# Patient Record
Sex: MALE | Race: WHITE | NOT HISPANIC OR LATINO | URBAN - METROPOLITAN AREA
[De-identification: names, ages, dates, MRNs, and addresses within clinical notes are randomized per-mention and may not be internally consistent; named-entity substitution may affect disease eponyms.]

---

## 2024-08-09 ENCOUNTER — OFFICE VISIT (OUTPATIENT)
Age: 19
End: 2024-08-09
Payer: COMMERCIAL

## 2024-08-09 VITALS
OXYGEN SATURATION: 99 % | HEART RATE: 112 BPM | WEIGHT: 129.4 LBS | HEIGHT: 68 IN | DIASTOLIC BLOOD PRESSURE: 70 MMHG | SYSTOLIC BLOOD PRESSURE: 114 MMHG | BODY MASS INDEX: 19.61 KG/M2

## 2024-08-09 DIAGNOSIS — M99.01 SEGMENTAL DYSFUNCTION OF CERVICAL REGION: ICD-10-CM

## 2024-08-09 DIAGNOSIS — M99.02 SEGMENTAL DYSFUNCTION OF THORACIC REGION: ICD-10-CM

## 2024-08-09 DIAGNOSIS — M79.18 MYOFASCIAL PAIN: Primary | ICD-10-CM

## 2024-08-09 PROCEDURE — 99203 OFFICE O/P NEW LOW 30 MIN: CPT | Performed by: CHIROPRACTOR

## 2024-08-09 NOTE — PROGRESS NOTES
1. Myofascial pain        2. Segmental dysfunction of thoracic region        3. Segmental dysfunction of cervical region          Assessment/Plan:    Review of Diagnostic Studies and Office Notes:    The patient's August 10, 2021 ultrasound report of the chest --left, Sara 15, 2023 pediatric office note (encounter for general adult medical examination without abnormal findings as primary diagnosis, no concerns at this time) were reviewed prior to the chiropractic evaluation today.    Decision and Treatment Plan:    The patient has a rounded shoulder posture with a forward head carriage placing abnormal stress on the cervical/upper thoracic musculature.  The patient is describing worsened upper thoracic, shoulder, and medial scapular symptoms with frequent and prolonged lifting.  Therefore the already hypertonic musculature is under more stress particularly as the workday progresses. The patient has myofascial findings as well as thoracic facet restrictions and cervical facet restrictions which can contribute to limited range of motion and pain.  The patient was taught pectoralis/subscapularis stretches to address his posterior.  The patient will benefit from strength exercises to the rhomboids, mid trapezius, inferior trapezius, and latissimus dorsi in the future.    The patient will be treated with conservative chiropractic care including myofascial release, joint mobilization, and a home stretching and icing program.    The patient was taught a pectoralis/subscapularis stretch today. The patient was advised to do the stretch for 3 sets of 15-20 seconds several times per day.The need to stretch to the point of tension only was discussed. ROM was measured with an [employer] digital dual inclinometer.    The patient will initially be seen 2 times per week and then 1 time per week as there are decreased symptoms and improvement in objective findings. The patient will then be discharged.    Patient  Instructions:    The patient was advised to apply ice to the region in 15-minute intervals a minimum of 3 times per day.  The patient was advised to avoid sleeping in the prone position. Proper sleeping postures and use of pillows were discussed.  The patient was advised to avoid prolonged cervical flexion. Proper techniques to use his phone and read were discussed.    Goals:    JMLGOALS: Improve patient's ability to tolerate prolonged physical activity, Improve cervical range of motion, and improve ability to tolerate frequent lifting (proper lifting/bending postures)    Time/Reviewed with Patient:    During the consultation the following was included: The patient's history/current complaints, physical exam, reviewing the office notes/diagnostic report, reviewing home instruction/reducing risk factors with the patient, reviewing my findings with the patient, and discussing the treatment/treatment plan with the patient.    Subjective:      Lew Joel is a 19 y.o. male who presents today with pain in the bilateral upper thoracic and medial scapular region.  He also indicated having discomfort into the bilateral shoulder region and upper arms.  The patient stated the symptoms began approximately 4 to 5 years ago.  He reported the symptoms were mild at that time but have worsened over the past month.  He stated there was no incident or trauma that caused the pain.  Instead it was a gradual onset of symptoms.  He reported that he does frequent lifting as he is a  at [employer] on [facility].  He stated that he has been a  for approximately 1 year.  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  He stated he has decree symptoms with rest.  The patient denied having symptoms distal to the elbow.  The patient reported that his right-sided symptoms are equal in intensity to  "the left.  The patient describes his pain level as a 3 on a 0-10 pain scale today.  He describes his pain level as a 6 on a 0-10 pain scale at its worst.     Objective:     /70   Pulse (!) 112   Ht 5' 8\" (1.727 m)   Wt 58.7 kg (129 lb 6.4 oz)   SpO2 99%   BMI 19.68 kg/m²     Appearance: Well developed, well nourished, and in no acute distress    Alert and oriented x 3    Mood/Affect: calm and pleasant    Gait/Posture:    Gait: Normal    Antalgic posture: None    Lordosis: Cervical- decreased    Kyphosis: Thoracic- normal    Upper extremity reflexes:    Deep tendon reflexes were normal and symmetrical in the upper extremities.    Upper Extremity Muscle Testing:    Muscle testing of the bilateral upper extremities was normal and graded +5/5.    Sensory:    Sensation to light touch was normal and symmetrical in the bilateral upper extremities.    Quiles's was negative bilaterally.    Cervical Orthopedic Tests:    Maximal Foraminal Compression was was negative  bilaterally.    Rotator Cuff Muscle Testing:    Right    Supraspinatus grade 5 - 100% normal (N) complete motion against gravity and full resistance    Infraspinatus grade 5 - 100% normal (N) complete motion against gravity and full resistance    Subscapularis grade 5 - 100% normal (N) complete motion against gravity and full resistance    No pain with muscle testing of the right rotator cuff musculature.    Left    Supraspinatus grade 5 - 100% normal (N) complete motion against gravity and full resistance    Infraspinatus grade 5 - 100% normal (N) complete motion against gravity and full resistance    Subscapularis grade 5 - 100% normal (N) complete motion against gravity and full resistance    No pain with muscle testing of the left rotator cuff musculature.    Shoulder Orthopedic Testing:    Inferior scratch test on the right was negative  as the patient was able to touch the mid to lower thoracic region.    Inferior scratch test on the left was " negative  as the patient was able to touch the mid to lower thoracic region..    Superior scratch test on the right was negative  as the patient was able to touch the T6 level.    Superior scratch test on the left was negative  as the patient was able to touch the T6 level.    Active Cervical Ranges of Motion:    Flexion: 58 degrees    Extension: 43 degrees    Right lateral flexion: 45 degrees    Left Lateral flexion: 39 degrees    Right Rotation: 78 degrees    Left Rotation: 86 degrees    Palpation:    Active myofascial trigger points and hypertonicity were present in the bilateral superior trapezius, levator scapulae, semispinalis capitis, splenius capitis, and splenius cervicis. Pressure to the above listed trigger points reproduced some of the pain described in today's chief complaint. Intersegmental motion was decreased in the cervical spine including joint dysfunctions at C1-C2, C5-C6, and C6-C7. Intersegmental motion was decreased in the thoracic spine including joint dysfunctions at T2-3, T3-4, T6-7, and T7-8.               Dictation voice to text software has been used in the creation of this document. Please consider this in light of any contextual or grammatical errors.

## 2024-08-09 NOTE — PATIENT INSTRUCTIONS
The patient was advised to apply ice to the region in 15-minute intervals a minimum of 3 times per day.  The patient was advised to avoid sleeping in the prone position. Proper sleeping postures and use of pillows were discussed.  The patient was advised to avoid prolonged cervical flexion. Proper techniques to use his phone and read were discussed.

## 2024-08-14 ENCOUNTER — PROCEDURE VISIT (OUTPATIENT)
Age: 19
End: 2024-08-14
Payer: COMMERCIAL

## 2024-08-14 VITALS
HEIGHT: 68 IN | SYSTOLIC BLOOD PRESSURE: 128 MMHG | DIASTOLIC BLOOD PRESSURE: 80 MMHG | BODY MASS INDEX: 19.55 KG/M2 | WEIGHT: 129 LBS | OXYGEN SATURATION: 98 % | HEART RATE: 93 BPM

## 2024-08-14 DIAGNOSIS — M79.18 MYOFASCIAL PAIN: Primary | ICD-10-CM

## 2024-08-14 DIAGNOSIS — M99.02 SEGMENTAL DYSFUNCTION OF THORACIC REGION: ICD-10-CM

## 2024-08-14 DIAGNOSIS — M99.01 SEGMENTAL DYSFUNCTION OF CERVICAL REGION: ICD-10-CM

## 2024-08-14 PROCEDURE — 98940 CHIROPRACT MANJ 1-2 REGIONS: CPT | Performed by: CHIROPRACTOR

## 2024-08-14 NOTE — PATIENT INSTRUCTIONS
The patient was advised to continue with the icing and stretching instructions.   The patient was informed that he may experience additional soreness following the today's treatment visit. The need to continue with the stretching exercises and apply ice in 15-minute intervals was discussed with the patient.   The patient was informed they may see redness or possible bruising in the region of Graston technique treatment.

## 2024-08-14 NOTE — PROGRESS NOTES
Assessment:  Today is the patient's first chiropractic treatment visit.  The patient was informed he may experience additional soreness following the today's treatment visit. The need to continue with the stretching exercises and apply ice in 15-minute intervals was discussed with the patient.  The patient is in need of shoulder stabilization exercises as the sublux easily with external rotation coupled with abduction movements    1. Myofascial pain        2. Segmental dysfunction of thoracic region        3. Segmental dysfunction of cervical region          Plan:  Treatment today consisted of myofascial release via Graston Technique to the bilateral upper thoracic musculature including the levator scapula at the superior angle of the scapula (superficial). GT 3, GT 4, and GT 5 were used.  Myofascial release via active release technique was performed to the bilateral pectoralis minor.  Myofascial release via active release technique was performed to the bilateral pectoralis major (1 pass on each side, discontinued due to apparent subluxing of glenohumeral joint).    Manipulation was performed to the lower cervical restrictions via gentle cervical traction technique.  Manipulation was performed to the thoracic restrictions via grade 2 mobilization techniques and low force diversified maneuvers.  No high velocity thrust was applied.  An audible release occurred and was well-tolerated.    Discussion  The glenohumeral joint immediate reduced after the pass of active release technique.  The patient's upper extremity was approximately 35 degrees from parallel to the floor when the subluxation was noted to occur.    Subjective:  The patient reported feeling the same as his initial chiropractic evaluation.  He indicated having pain in the bilateral upper thoracic and medial scapular region.  He also indicated having discomfort into the bilateral shoulder region and upper arms.  The patient has more pronounced symptoms with  frequent lifting.  He has decreased symptoms with rest.  The patient denied having symptoms distal to the elbow.  The patient reported that his right-sided symptoms are equal in intensity to the left.  The patient describes his pain level as a 3 on a 0-10 pain scale today.  He describes his pain level as a 6 on a 0-10 pain scale at its worst.     Objective:  Active myofascial trigger points and hypertonicity were present in the bilateral superior trapezius, levator scapulae, semispinalis capitis, splenius capitis, and splenius cervicis. Pressure to the above listed trigger points reproduced some of the pain described in today's chief complaint. Intersegmental motion was decreased in the cervical spine including joint dysfunctions at C1-C2, C5-C6, and C6-C7. Intersegmental motion was decreased in the thoracic spine including joint dysfunctions at T2-3, T3-4, T6-7, and T7-8.  When the patient attempted active release technique to the pectoralis major it was noted that the ipsilateral glenohumeral joint subluxed briefly and the patient indicated that was common for him with this type of movement.      I have used the Epic copy/forward function to compose this note.  I have reviewed my current note to ensure it reflects the current patient status, exam, assessment and plan.     Dictation voice to text software has been used in the creation of this document. Please consider this in light of any contextual or grammatical errors.

## 2024-08-21 ENCOUNTER — PROCEDURE VISIT (OUTPATIENT)
Age: 19
End: 2024-08-21
Payer: COMMERCIAL

## 2024-08-21 VITALS
HEIGHT: 68 IN | WEIGHT: 129 LBS | SYSTOLIC BLOOD PRESSURE: 136 MMHG | HEART RATE: 89 BPM | DIASTOLIC BLOOD PRESSURE: 80 MMHG | BODY MASS INDEX: 19.55 KG/M2 | OXYGEN SATURATION: 98 %

## 2024-08-21 DIAGNOSIS — M99.01 SEGMENTAL DYSFUNCTION OF CERVICAL REGION: ICD-10-CM

## 2024-08-21 DIAGNOSIS — M79.18 MYOFASCIAL PAIN: Primary | ICD-10-CM

## 2024-08-21 DIAGNOSIS — M99.02 SEGMENTAL DYSFUNCTION OF THORACIC REGION: ICD-10-CM

## 2024-08-21 PROCEDURE — 98940 CHIROPRACT MANJ 1-2 REGIONS: CPT | Performed by: CHIROPRACTOR

## 2024-08-21 NOTE — PROGRESS NOTES
Assessment:  The patient is progressing as expected.  The patient was taught a push-up plus wall exercise for home use.  The patient was taught a push plus exercise (wall) to perform at 0 - 10: 2 sets of 0 - 10: 5 repetitions 0 - 10: 2 time per day.  The exercises also demonstrated to him in the push-up position on the floor but not correctly demonstrated by the patient.  Therefore the movement pattern will be learned while doing the exercise against standing with his hands on the wall initially with the hopes of progressing to the floor.    1. Myofascial pain        2. Segmental dysfunction of thoracic region        3. Segmental dysfunction of cervical region          Plan:  Treatment today consisted of myofascial release via Graston Technique to the bilateral upper thoracic musculature including the levator scapula at the superior angle of the scapula and supraspinatus (superficial to the bilateral supraspinatus). GT 3, GT 4, and GT 5 were used.  Myofascial release via active release technique was performed to the bilateral pectoralis minor.      Manipulation was performed to the lower cervical restrictions via gentle cervical traction technique.  Manipulation was performed to the thoracic restrictions via grade 2 mobilization techniques and low force diversified maneuvers.  No high velocity thrust was applied.  An audible release occurred and was well-tolerated.    Subjective:  The patient reported feeling a little better when compared to his initial chiropractic treatment.  The patient denied experiencing additional soreness associated with Graston Technique.  He indicated having pain in the bilateral upper thoracic and medial scapular region.  He also indicated having discomfort into the bilateral shoulder region and upper arms.  The patient has more pronounced symptoms with frequent lifting.  He has decreased symptoms with rest.  The patient denied having symptoms distal to the elbow.  The patient reported  that his right-sided symptoms are equal in intensity to the left.  Despite reporting feeling a little better the patient describes his pain level as a 6 on a 0-10 pain scale today which is higher than the last chiropractic treatment visit.      Objective:  Active myofascial trigger points and hypertonicity were present in the bilateral superior trapezius, levator scapulae, semispinalis capitis, splenius capitis, and splenius cervicis. Intersegmental motion was decreased in the cervical spine including joint dysfunctions at C1-C2, C5-C6, and C6-C7. Intersegmental motion was decreased in the thoracic spine including joint dysfunctions at T3-4, T6-7, and T7-8.        I have used the Epic copy/forward function to compose this note.  I have reviewed my current note to ensure it reflects the current patient status, exam, assessment and plan.     Dictation voice to text software has been used in the creation of this document. Please consider this in light of any contextual or grammatical errors.

## 2024-08-21 NOTE — PATIENT INSTRUCTIONS
The patient was advised to continue with the icing and stretching instructions.   The patient was taught a push plus exercise to perform at 0 - 10: 2 sets of 0 - 10: 5 repetitions 0 - 10: 2 time per day.

## 2024-08-23 ENCOUNTER — PROCEDURE VISIT (OUTPATIENT)
Age: 19
End: 2024-08-23
Payer: COMMERCIAL

## 2024-08-23 VITALS
HEIGHT: 68 IN | HEART RATE: 87 BPM | OXYGEN SATURATION: 97 % | BODY MASS INDEX: 19.55 KG/M2 | DIASTOLIC BLOOD PRESSURE: 68 MMHG | SYSTOLIC BLOOD PRESSURE: 104 MMHG | WEIGHT: 129 LBS

## 2024-08-23 DIAGNOSIS — M79.18 MYOFASCIAL PAIN: Primary | ICD-10-CM

## 2024-08-23 DIAGNOSIS — M99.01 SEGMENTAL DYSFUNCTION OF CERVICAL REGION: ICD-10-CM

## 2024-08-23 DIAGNOSIS — M99.02 SEGMENTAL DYSFUNCTION OF THORACIC REGION: ICD-10-CM

## 2024-08-23 PROCEDURE — 98940 CHIROPRACT MANJ 1-2 REGIONS: CPT | Performed by: CHIROPRACTOR

## 2024-08-23 NOTE — PROGRESS NOTES
Assessment:  The patient is progressing as expected.    1. Myofascial pain        2. Segmental dysfunction of thoracic region        3. Segmental dysfunction of cervical region          Plan:  Treatment today consisted of myofascial release via ischemic compression to the bilateral upper thoracic musculature.  Myofascial release via active release technique was performed to the bilateral pectoralis minor and junction of the bilateral superior trapezius at the levator scapula (supine combination move).      Manipulation was performed to the lower cervical restrictions via gentle cervical traction technique.  Manipulation was performed to the thoracic restrictions via grade 2 mobilization techniques and low force diversified maneuvers.  No high velocity thrust was applied.  An audible release occurred and was well-tolerated.    Subjective:  The patient reported feeling a little better when compared to his initial chiropractic treatment. He indicated having pain in the bilateral upper thoracic and medial scapular region.  The patient did not report a significant amount of soreness associated with the last chiropractic treatment visit/Graston Technique.  He also indicated having discomfort into the bilateral shoulder region and upper arms.  The patient has more pronounced symptoms with frequent lifting.  He has decreased symptoms with rest. The patient describes his pain level as a 5 on a 0-10 pain scale today.    Objective:  Active myofascial trigger points and hypertonicity were present in the bilateral superior trapezius, levator scapulae, semispinalis capitis, splenius capitis, and splenius cervicis. Intersegmental motion was decreased in the cervical spine including joint dysfunctions at C1-C2, C5-C6, and C6-C7. Intersegmental motion was decreased in the thoracic spine including joint dysfunctions at T3-4, T4-5, and T7-8.        I have used the Epic copy/forward function to compose this note.  I have reviewed my  current note to ensure it reflects the current patient status, exam, assessment and plan.     Dictation voice to text software has been used in the creation of this document. Please consider this in light of any contextual or grammatical errors.

## 2024-08-23 NOTE — PATIENT INSTRUCTIONS
The patient was advised to continue with the icing and stretching instructions.   The patient was advised to continue with the home stretching and strengthening exercises.   The patient was informed that he may experience additional soreness following the today's treatment visit. The need to continue with the stretching exercises and apply ice in 15-minute intervals was discussed with the patient.

## 2024-08-28 ENCOUNTER — PROCEDURE VISIT (OUTPATIENT)
Age: 19
End: 2024-08-28
Payer: COMMERCIAL

## 2024-08-28 VITALS
HEIGHT: 68 IN | SYSTOLIC BLOOD PRESSURE: 126 MMHG | DIASTOLIC BLOOD PRESSURE: 80 MMHG | WEIGHT: 129 LBS | BODY MASS INDEX: 19.55 KG/M2 | OXYGEN SATURATION: 98 % | HEART RATE: 83 BPM

## 2024-08-28 DIAGNOSIS — M79.18 MYOFASCIAL PAIN: Primary | ICD-10-CM

## 2024-08-28 DIAGNOSIS — M99.01 SEGMENTAL DYSFUNCTION OF CERVICAL REGION: ICD-10-CM

## 2024-08-28 DIAGNOSIS — M99.02 SEGMENTAL DYSFUNCTION OF THORACIC REGION: ICD-10-CM

## 2024-08-28 PROCEDURE — 98940 CHIROPRACT MANJ 1-2 REGIONS: CPT | Performed by: CHIROPRACTOR

## 2024-08-28 NOTE — PATIENT INSTRUCTIONS
The patient was advised to continue with the icing and stretching instructions.   The patient was informed that he may experience additional soreness following the today's treatment visit. The need to continue with the stretching exercises and apply ice in 15-minute intervals was discussed with the patient.

## 2024-08-28 NOTE — PROGRESS NOTES
Assessment:  Patient is describing an exacerbation of symptoms.  However the patient is not sure as he indicated that it was when he was lying facedown but he was lying facedown during the first part of the last chiropractic treatment as ischemic compression was being performed.  He indicated that the pain was present with too much digital pressure.  He then stated that he thinks it may have been when he was lying supine.  In review of my notes it was noted that I had done active release technique to the junction of the superior trapezius with the levator scapula.  This seems to be more likely of a source of more pronounced neck discomfort.  Nonetheless neither of those forms of treatment will be performed.    1. Myofascial pain        2. Segmental dysfunction of thoracic region        3. Segmental dysfunction of cervical region          Plan:  Treatment today consisted of myofascial release via Graston Technique to the bilateral upper thoracic musculature including the levator scapula at the superior angle of the scapula and supraspinatus (superficial to the bilateral supraspinatus). GT 3, GT 4, and GT 5 were used.  Myofascial release via active release technique was performed to the bilateral pectoralis minor.      Manipulation was performed to the thoracic restrictions via grade 2 mobilization techniques and low force diversified maneuvers.  No high velocity thrust was applied.  An audible release occurred and was well-tolerated.    Subjective:  The patient reported feeling increased pain in the bilateral posterior cervical/cervical thoracic region after his chiropractic treatment.  He also describes experiencing a headache in the occipital portion of his head.  He stated this persisted for approximately 3 days.  The patient stated it was due to one of the last part of the treatment during the last chiropractic visit.  He then indicated that it was when he was lying facedown and digital pressure was being applied  to the muscles which is how his ischemic compression was being performed.  However ischemic compression was performed first.  The patient stated that the pain in the posterior cervical region fully resolved and he does not have a headache any longer.  He indicated having pain in the bilateral upper thoracic and medial scapular region. The patient has more pronounced symptoms with frequent lifting.  He has decreased symptoms with rest. The patient describes his pain level as a 6 on a 0-10 pain scale today.    Objective:  Active myofascial trigger points and hypertonicity were present in the bilateral superior trapezius, pectoralis minor, supraspinatus, pectoralis major, and levator scapulae. Intersegmental motion was decreased in the cervical spine including joint dysfunctions at C1-C2, C5-C6, and C6-C7. Intersegmental motion was decreased in the thoracic spine including joint dysfunctions at T3-4 and T7-8.        I have used the Epic copy/forward function to compose this note.  I have reviewed my current note to ensure it reflects the current patient status, exam, assessment and plan.     Dictation voice to text software has been used in the creation of this document. Please consider this in light of any contextual or grammatical errors.

## 2024-09-04 ENCOUNTER — PROCEDURE VISIT (OUTPATIENT)
Age: 19
End: 2024-09-04
Payer: COMMERCIAL

## 2024-09-04 VITALS
HEART RATE: 89 BPM | HEIGHT: 68 IN | DIASTOLIC BLOOD PRESSURE: 66 MMHG | SYSTOLIC BLOOD PRESSURE: 124 MMHG | BODY MASS INDEX: 19.55 KG/M2 | WEIGHT: 129 LBS | OXYGEN SATURATION: 99 %

## 2024-09-04 DIAGNOSIS — M99.02 SEGMENTAL DYSFUNCTION OF THORACIC REGION: ICD-10-CM

## 2024-09-04 DIAGNOSIS — M79.18 MYOFASCIAL PAIN: Primary | ICD-10-CM

## 2024-09-04 DIAGNOSIS — M99.01 SEGMENTAL DYSFUNCTION OF CERVICAL REGION: ICD-10-CM

## 2024-09-04 PROCEDURE — 98940 CHIROPRACT MANJ 1-2 REGIONS: CPT | Performed by: CHIROPRACTOR

## 2024-09-04 NOTE — PROGRESS NOTES
Assessment:  The patient is progressing as expected.  The push-up plus wall exercise and pectoralis/subscapularis stretch were reviewed with the patient again.  Corrections were made to the push-up plus wall exercise as the patient had performed incorrectly.    1. Myofascial pain        2. Segmental dysfunction of thoracic region        3. Segmental dysfunction of cervical region          Plan:  Treatment today consisted of myofascial release via Graston Technique to the bilateral upper thoracic musculature including the levator scapula at the superior angle of the scapula and supraspinatus. GT 3, GT 4, and GT 5 were used.  Myofascial release via active release technique was performed to the bilateral subscapularis (patient performed active movements)    Manipulation was performed to the thoracic restrictions via grade 1 mobilization techniques in the prone position.  No high velocity thrust was applied.    Subjective:  The patient reported feeling the same as the last chiropractic treatment.  The patient reported that he has been doing the prescribed exercise (push-up plus exercise) but indicated that he has not been doing it as frequently as suggested.  He indicated having pain in the bilateral upper thoracic and medial scapular region.  The patient reported that he worked throughout the Labor Day weekend and they were very busy at work. The patient has more pronounced symptoms with frequent lifting.  He has decreased symptoms with rest. The patient describes his pain level as a 6 on a 0-10 pain scale today.    Objective:  The patient performed the push-up plus wall exercise and pectoralis/subscapularis stretching exercise incorrectly.  Active myofascial trigger points and hypertonicity were present in the bilateral superior trapezius, levator scapulae, semispinalis capitis, splenius capitis, and splenius cervicis. Intersegmental motion was decreased in the cervical spine including joint dysfunctions at C1-C2,  C5-C6, and C6-C7. Intersegmental motion was decreased in the thoracic spine including joint dysfunctions at T3-4, T6-7, and T7-8.        I have used the Epic copy/forward function to compose this note.  I have reviewed my current note to ensure it reflects the current patient status, exam, assessment and plan.     Dictation voice to text software has been used in the creation of this document. Please consider this in light of any contextual or grammatical errors.

## 2024-09-18 ENCOUNTER — PROCEDURE VISIT (OUTPATIENT)
Age: 19
End: 2024-09-18
Payer: COMMERCIAL

## 2024-09-18 VITALS
BODY MASS INDEX: 19.55 KG/M2 | WEIGHT: 129 LBS | HEIGHT: 68 IN | SYSTOLIC BLOOD PRESSURE: 116 MMHG | OXYGEN SATURATION: 99 % | HEART RATE: 76 BPM | DIASTOLIC BLOOD PRESSURE: 70 MMHG

## 2024-09-18 DIAGNOSIS — M99.02 SEGMENTAL DYSFUNCTION OF THORACIC REGION: ICD-10-CM

## 2024-09-18 DIAGNOSIS — M79.18 MYOFASCIAL PAIN: Primary | ICD-10-CM

## 2024-09-18 DIAGNOSIS — M99.01 SEGMENTAL DYSFUNCTION OF CERVICAL REGION: ICD-10-CM

## 2024-09-18 PROCEDURE — 98940 CHIROPRACT MANJ 1-2 REGIONS: CPT | Performed by: CHIROPRACTOR

## 2024-09-18 NOTE — PROGRESS NOTES
Assessment:  The patient is progressing as expected.  The push-up plus wall exercise was reviewed with the patient again.  The patient did the push-up plus wall exercise correctly.    1. Myofascial pain        2. Segmental dysfunction of thoracic region        3. Segmental dysfunction of cervical region          Plan:  Treatment today consisted of myofascial release via Graston Technique to the bilateral upper thoracic musculature including the levator scapula at the superior angle of the scapula and supraspinatus. GT 3, GT 4, and GT 5 were used.  Myofascial release via active release technique was performed to the bilateral subscapularis (patient performed active movements) and bilateral pectoralis minor.    Manipulation was performed to the thoracic restrictions via grade 1 mobilization techniques in the prone position.  No high velocity thrust was applied.    Subjective:  The patient reported feeling better when compared to the last chiropractic treatment.  However he stated that he has not been working over the past week as he had been at a festival and also a concert.  The patient reported that he has not been doing the prescribed exercise (push-up plus exercise) as frequently as suggested.  He indicated having intermittent pain in the bilateral upper thoracic and medial scapular region but not currently. The patient has more pronounced symptoms with frequent lifting.  He has decreased symptoms with rest. The patient describes his pain level as a 0 on a 0-10 pain scale today.    Objective:  The patient performed the push-up plus wall exercise correctly.  Active myofascial trigger points and hypertonicity were present in the bilateral superior trapezius, levator scapulae, semispinalis capitis, splenius capitis, and splenius cervicis. Intersegmental motion was decreased in the cervical spine including joint dysfunctions at C1-C2, C5-C6, and C6-C7. Intersegmental motion was decreased in the thoracic spine  including joint dysfunctions at T3-4, T4-5, and T7-8.        I have used the Epic copy/forward function to compose this note.  I have reviewed my current note to ensure it reflects the current patient status, exam, assessment and plan.     Dictation voice to text software has been used in the creation of this document. Please consider this in light of any contextual or grammatical errors.

## 2024-09-18 NOTE — PATIENT INSTRUCTIONS
The patient was advised to continue with the home stretching and strengthening exercises. The patient was advised to apply ice to the region in 15-minute intervals a minimum of 3 times per day.

## 2024-09-25 ENCOUNTER — PROCEDURE VISIT (OUTPATIENT)
Age: 19
End: 2024-09-25
Payer: COMMERCIAL

## 2024-09-25 VITALS
SYSTOLIC BLOOD PRESSURE: 110 MMHG | HEIGHT: 68 IN | HEART RATE: 77 BPM | BODY MASS INDEX: 19.55 KG/M2 | DIASTOLIC BLOOD PRESSURE: 66 MMHG | WEIGHT: 129 LBS | OXYGEN SATURATION: 96 %

## 2024-09-25 DIAGNOSIS — M79.18 MYOFASCIAL PAIN: Primary | ICD-10-CM

## 2024-09-25 DIAGNOSIS — M99.02 SEGMENTAL DYSFUNCTION OF THORACIC REGION: ICD-10-CM

## 2024-09-25 DIAGNOSIS — M99.01 SEGMENTAL DYSFUNCTION OF CERVICAL REGION: ICD-10-CM

## 2024-09-25 PROCEDURE — 98940 CHIROPRACT MANJ 1-2 REGIONS: CPT | Performed by: CHIROPRACTOR

## 2024-09-25 NOTE — PATIENT INSTRUCTIONS
The patient was advised to continue with the home stretching and strengthening exercises.   The patient was advised to do the stretching exercises a minimum of 3 times per day. Otherwise progress will be slowed. The need to stretch to the point of tension only was emphasized.  The patient was advised to apply ice to the region in 15-minute intervals a minimum of 3 times per day.   The patient was informed that he may experience additional soreness following the today's treatment visit. The need to continue with the stretching exercises and apply ice in 15-minute intervals was discussed with the patient.

## 2024-09-25 NOTE — PROGRESS NOTES
Assessment:  The patient is only making chiropractic appointments 1 time per week.  It is expected that this will slow his progress and make it difficult for his symptoms to decrease as there is a large gap between chiropractic visits.  He also indicated that he is not doing the stretching and strengthening exercises as often as instructed.  This will also slow his progress.  The patient was advised to do the stretching exercises a minimum of 3 times per day. Otherwise progress will be slowed. The need to stretch to the point of tension only was emphasized.  The patient was advised to apply ice to the region in 15-minute intervals a minimum of 3 times per day.   I reviewed the patient's initial chiropractic treatment note as he had indicated experiencing some improvement afterwards.  The same chiropractic treatment plan was performed today.  The push-up plus wall exercise and pectoralis/subscapularis stretch were reviewed again with the patient and corrections were made.    1. Myofascial pain        2. Segmental dysfunction of thoracic region        3. Segmental dysfunction of cervical region          Plan:  Treatment today consisted of myofascial release via Graston Technique to the bilateral upper thoracic musculature including the levator scapula at the superior angle of the scapula and supraspinatus. GT 3, GT 4, and GT 5 were used.  Myofascial release via active release technique was performed to the bilateral subscapularis (patient performed active movements) and bilateral pectoralis minor.    Manipulation was performed to the thoracic restrictions via grade 2 mobilization techniques and low force diversified maneuvers.  No high velocity thrust was applied.    Subjective:  The patient reported feeling the same as the last chiropractic treatment. The patient reported that he has not been doing the prescribed exercise (push-up plus exercise) as frequently as suggested.  He indicated having intermittent pain in  the bilateral upper thoracic and medial scapular region.  He reported that he worked over the weekend. The patient has more pronounced symptoms with frequent lifting.  He has decreased symptoms with rest. The patient describes his pain level as a 6 on a 0-10 pain scale today.    Objective:  The patient performed the push-up plus wall exercise with the minor need of corrections.  The patient also demonstrated the pectoralis/subscapularis stretch with the need of corrections.  Active myofascial trigger points and hypertonicity were present in the bilateral superior trapezius, levator scapulae, and splenius capitis. Intersegmental motion was decreased in the cervical spine including joint dysfunctions at C1-C2, C5-C6, and C6-C7. Intersegmental motion was decreased in the thoracic spine including joint dysfunctions at T3-4, T6-7, and T7-8.        I have used the Epic copy/forward function to compose this note.  I have reviewed my current note to ensure it reflects the current patient status, exam, assessment and plan.     Dictation voice to text software has been used in the creation of this document. Please consider this in light of any contextual or grammatical errors.

## 2024-09-27 ENCOUNTER — PROCEDURE VISIT (OUTPATIENT)
Age: 19
End: 2024-09-27
Payer: COMMERCIAL

## 2024-09-27 VITALS
HEIGHT: 68 IN | OXYGEN SATURATION: 99 % | WEIGHT: 129 LBS | SYSTOLIC BLOOD PRESSURE: 108 MMHG | BODY MASS INDEX: 19.55 KG/M2 | HEART RATE: 81 BPM | DIASTOLIC BLOOD PRESSURE: 74 MMHG

## 2024-09-27 DIAGNOSIS — M79.18 MYOFASCIAL PAIN: Primary | ICD-10-CM

## 2024-09-27 DIAGNOSIS — M99.01 SEGMENTAL DYSFUNCTION OF CERVICAL REGION: ICD-10-CM

## 2024-09-27 DIAGNOSIS — M99.02 SEGMENTAL DYSFUNCTION OF THORACIC REGION: ICD-10-CM

## 2024-09-27 PROCEDURE — 99213 OFFICE O/P EST LOW 20 MIN: CPT | Performed by: CHIROPRACTOR

## 2024-09-27 NOTE — PROGRESS NOTES
1. Myofascial pain        2. Segmental dysfunction of thoracic region        3. Segmental dysfunction of cervical region          Assessment/Plan:    Review of office note/neck index:    The patient's August 9, 2024 initial chiropractic evaluation office note and neck index were reviewed today to determine progress since the chiropractic initial evaluation.    Medical Decision Making and Treatment Plan:    The patient described his symptoms as 20% improved overall.  It should be noted that the patient is supposed to attend chiropractic visits 2 times per week in order to make adequate progress.  The patient has only been attending chiropractic visits 1 time per week.  If the patient has a decreased frequency of chiropractic care it is necessary to do the stretching and strengthening exercises frequently but the patient has not been doing the stretching and strengthening exercises as often as instructed.  Therefore it is expected that he would have slowed progress.  Nonetheless the patient is reporting improvement and has improvement in objective findings.    The patient is indicating having pain in the first metacarpal phalangeal joints of the bilateral hands.  He is indicating that this is present with prolonged use of the hands and playing video games and has decreased in intensity over the past 5 years as he has been playing videogames less frequently.  The patient indicated that the first metacarpal phalangeal joint pain is relieved with self manipulation of the joints indicating a mechanical source of symptoms.  The patient's hand symptoms will be monitored and if the symptoms persist he may be referred for a rheumatology evaluation.    Objectively, the patient has increased cervical flexion and left lumbar lateral flexion with mild increase in cervical extension but decreased left cervical rotation when compared to his  initial chiropractic evaluation. his neck index decreased from 20 to 16 indicating  "positive functional improvement since his initial chiropractic evaluation.    Due to the improvement in objective findings and function and report of relief of symptoms I will continue with chiropractic care.    The patient will be treated with conservative chiropractic care including myofascial release, joint mobilization, and a home stretching and icing program.    The patient will be seen 2 times per week for 4 weeks and then reassess for progress.  Treatment compliance was reviewed with the patient.  Home treatment compliance was also reviewed with the patient.    Patient Instructions:    The patient was advised to continue with the icing and stretching instructions.   The patient was advised to do the stretching exercises a minimum of 3 times per day. Otherwise progress will be slowed. The need to stretch to the point of tension only was emphasized.  The patient was advised to continue with the home stretching and strengthening exercises.     Goals:    JMLGOALS: Improve patient's ability to tolerate twisting coupled with reaching movements.    At least 20 minutes of time was spent with the patient during the consultation including the patient's history/current complaints, physical exam, reviewing the initial chiropractic evaluation office note and back index to determine progress, reviewing home instruction/reducing risk factors with the patient, reviewing my findings/progress with the patient, and discussing the treatment/treatment plan with the patient.    Subjective:      Lew Joel is a 19 y.o. male who reported feeling \"a little better\" when compared to the last chiropractic visit.  Overall he described the symptoms as 20% improved.  He points to the bilateral medial cervicothoracic region/upper thoracic/superior angle of the scapular region as location of symptoms/pain.  He also indicated having symptoms into the bilateral superior lateral shoulder and bilateral mid deltoid region.  The patient stated " "that he has increased symptoms in the cervicothoracic region/upper thoracic/superior angle of the scapular region and shoulder regions with twisting coupled with upper extremity reaching movements.  He reported that he is a  and needs to bag items.  The patient stated he has decreased symptoms in the cervicothoracic region/upper thoracic/superior angle of the scapular region and shoulder region with rest.  The patient was questioned whether he has pain or paresthesias in the upper extremities.  He denied experiencing shooting pain in the upper extremities or paresthesias in the upper extremities.  However he stated that he feels \"a little irritation\" (mild in intensity) in all of the metacarpal phalangeal joints of the bilateral hands.  The patient stated that the hand symptoms have been present over the past 5 years and had been worse when he played more video games.  He stated that he does not play video games for as long or as frequently and more recent years and therefore has less intense pain.  He reported he is aware of the pain in the metacarpal phalangeal joints of the left hand when he is writing.  He has left hand dominant.  The patient stated that he has decreased discomfort in the metacarpal phalangeal joints with self manipulation of the joints.  He reported that the metacarpal phalangeal joint pain of the bilateral hands is not constantly noted and he denied having any redness or swelling in the metacarpal phalangeal joint regions.  He stated that he is not aware of the discomfort in the metacarpal phalangeal joints of the bilateral hands while playing videogames but is aware of that afterwards.  The patient described his cervicothoracic region/upper thoracic/superior angle of the scapular region and shoulder pain as a 5 on a 0-10 pain scale today.         Objective:    Vitals:    09/27/24 1138   BP: 108/74   Pulse: 81   SpO2: 99%   Weight: 58.5 kg (129 lb)   Height: 5' 8\" (1.727 m) "     Appearance: Well developed, well nourished, and in no acute distress    Alert and oriented x 3    Mood/Affect: calm and pleasant    Gait/Posture:    Gait: Normal    Antalgic posture: None    Lordosis: Cervical - decreased    Kyphosis: Thoracic- normal    Upper extremity reflexes:    Deep tendon reflexes were normal and symmetrical in the upper extremities.    Upper Extremity Muscle Testing:    Muscle testing of the bilateral upper extremities was normal and graded +5/5.    Sensory:    Sensation to light touch was normal and symmetrical in the bilateral upper extremities.    Quiles's was negative bilaterally.    Cervical Orthopedic Tests:    Maximal Foraminal Compression was was negative  bilaterally.    Rotator Cuff Muscle Testing:    Right    Supraspinatus grade 5 - 100% normal (N) complete motion against gravity and full resistance    Infraspinatus grade 5 - 100% normal (N) complete motion against gravity and full resistance    Subscapularis grade 5 - 100% normal (N) complete motion against gravity and full resistance    No pain was present with muscle testing the right rotator cuff musculature.    Left    Supraspinatus grade 5 - 100% normal (N) complete motion against gravity and full resistance    Infraspinatus grade 5 - 100% normal (N) complete motion against gravity and full resistance    Subscapularis grade 5 - 100% normal (N) complete motion against gravity and full resistance    No pain was present with muscle testing the left rotator cuff musculature.    Active Cervical Ranges of Motion:    Flexion: 66 degrees    Extension: 46 degrees    Right lateral flexion: 44 degrees    Left Lateral flexion: 43 degrees    Right Rotation: 78 degrees    Left Rotation: 71 degrees    Palpation:  Active myofascial trigger points and hypertonicity were present in the bilateral superior trapezius, levator scapulae, and splenius capitis. Intersegmental motion was decreased in the cervical spine including joint  dysfunctions at C1-C2, C5-C6, and C6-C7. Intersegmental motion was decreased in the thoracic spine including joint dysfunctions at T3-4, T6-7, and T7-8.          I have used the Epic copy/forward function to compose this note. I have reviewed my current note to ensure it reflects the current patient status, exam, assessment and plan.    Dictation voice to text software has been used in the creation of this document. Please consider this in light of any contextual or grammatical errors.

## 2024-09-27 NOTE — PATIENT INSTRUCTIONS
The patient was advised to continue with the icing and stretching instructions.   The patient was advised to do the stretching exercises a minimum of 3 times per day. Otherwise progress will be slowed. The need to stretch to the point of tension only was emphasized.  The patient was advised to continue with the home stretching and strengthening exercises.

## 2024-10-09 ENCOUNTER — PROCEDURE VISIT (OUTPATIENT)
Age: 19
End: 2024-10-09
Payer: COMMERCIAL

## 2024-10-09 VITALS
HEIGHT: 68 IN | DIASTOLIC BLOOD PRESSURE: 76 MMHG | HEART RATE: 83 BPM | SYSTOLIC BLOOD PRESSURE: 122 MMHG | WEIGHT: 129 LBS | BODY MASS INDEX: 19.55 KG/M2 | OXYGEN SATURATION: 98 %

## 2024-10-09 DIAGNOSIS — M79.18 MYOFASCIAL PAIN: Primary | ICD-10-CM

## 2024-10-09 DIAGNOSIS — M99.02 SEGMENTAL DYSFUNCTION OF THORACIC REGION: ICD-10-CM

## 2024-10-09 DIAGNOSIS — M99.01 SEGMENTAL DYSFUNCTION OF CERVICAL REGION: ICD-10-CM

## 2024-10-09 PROCEDURE — 98940 CHIROPRACT MANJ 1-2 REGIONS: CPT | Performed by: CHIROPRACTOR

## 2024-10-09 NOTE — PROGRESS NOTES
Assessment:  The patient was advised to do the stretching exercises a minimum of 3 times per day. Otherwise progress will be slowed. The need to stretch to the point of tension only was emphasized.  The need to apply ice in 15 minute intervals several times per day (minimum of 3 times per day) was discussed with the patient. Otherwise the patient's progress may be slowed.  The patient was informed that he may experience additional soreness following the today's treatment visit. The need to continue with the stretching exercises and apply ice in 15-minute intervals was discussed with the patient.     1. Myofascial pain        2. Segmental dysfunction of thoracic region        3. Segmental dysfunction of cervical region          Plan:  Treatment today consisted of myofascial release via Graston Technique to the bilateral upper thoracic musculature including the levator scapula at the superior angle of the scapula and supraspinatus. GT 3, GT 4, and GT 5 were used.  Myofascial release via active release technique was performed to the bilateral subscapularis (patient performed active movements) and bilateral pectoralis minor.    Manipulation was performed to the thoracic restrictions via grade 2 mobilization techniques and low force diversified maneuvers.  No high velocity thrust was applied.  An audible release occurred and was well-tolerated.    Subjective:  The patient stated that he has some more pronounced symptoms in the upper thoracic/suprascapular region and superior lateral shoulder region today because he worked an unusually long shift 2 days ago.  The patient reported that, on days when he does the prescribed stretching exercises, he notices improvement in symptoms indicating that he does not do the stretching exercises multiple times daily.  The patient reported that he does not do the icing instructions as often as instructed either.  The patient has pain in the bilateral medial cervicothoracic region/upper  thoracic/superior angle of the scapular region, bilateral superior lateral shoulder, and bilateral mid deltoid region.  He has increased symptoms in the cervicothoracic region/upper thoracic/superior angle of the scapular region and shoulder regions with twisting coupled with upper extremity reaching movements.  He has decreased symptoms in the cervicothoracic region/upper thoracic/superior angle of the scapular region and shoulder region with rest. The patient described his cervicothoracic region/upper thoracic/superior angle of the scapular region and shoulder pain as a 6 on a 0-10 pain scale today.    Objective:  Active myofascial trigger points and hypertonicity were present in the bilateral superior trapezius, levator scapulae, and splenius capitis. Intersegmental motion was decreased in the cervical spine including joint dysfunctions at C1-C2, C5-C6, and C6-C7. Intersegmental motion was decreased in the thoracic spine including joint dysfunctions at T3-4, T6-7, and T7-8.          I have used the Epic copy/forward function to compose this note. I have reviewed my current note to ensure it reflects the current patient status, exam, assessment and plan.    Dictation voice to text software has been used in the creation of this document. Please consider this in light of any contextual or grammatical errors.

## 2024-10-09 NOTE — PATIENT INSTRUCTIONS
The patient was advised to continue with the icing and stretching instructions.   The patient was advised to do the stretching exercises a minimum of 3 times per day. Otherwise progress will be slowed. The need to stretch to the point of tension only was emphasized.  The need to apply ice in 15 minute intervals several times per day (minimum of 3 times per day) was discussed with the patient. Otherwise the patient's progress may be slowed.  The patient was informed that he may experience additional soreness following the today's treatment visit. The need to continue with the stretching exercises and apply ice in 15-minute intervals was discussed with the patient.

## 2024-10-16 ENCOUNTER — OFFICE VISIT (OUTPATIENT)
Age: 19
End: 2024-10-16
Payer: COMMERCIAL

## 2024-10-16 VITALS
OXYGEN SATURATION: 97 % | HEART RATE: 85 BPM | SYSTOLIC BLOOD PRESSURE: 110 MMHG | DIASTOLIC BLOOD PRESSURE: 72 MMHG | HEIGHT: 68 IN | BODY MASS INDEX: 19.61 KG/M2

## 2024-10-16 DIAGNOSIS — M99.02 SEGMENTAL DYSFUNCTION OF THORACIC REGION: ICD-10-CM

## 2024-10-16 DIAGNOSIS — M99.01 SEGMENTAL DYSFUNCTION OF CERVICAL REGION: ICD-10-CM

## 2024-10-16 DIAGNOSIS — M79.18 MYOFASCIAL PAIN: Primary | ICD-10-CM

## 2024-10-16 PROCEDURE — 98940 CHIROPRACT MANJ 1-2 REGIONS: CPT | Performed by: CHIROPRACTOR

## 2024-10-16 NOTE — PROGRESS NOTES
Assessment:    1. Myofascial pain        2. Segmental dysfunction of thoracic region        3. Segmental dysfunction of cervical region          Plan:  Treatment today consisted of myofascial release via Graston Technique to the bilateral upper thoracic musculature including the levator scapula at the superior angle of the scapula and supraspinatus. GT 3, GT 4, and GT 5 were used.  Myofascial release via active release technique was performed to the bilateral subscapularis (patient performed active movements) and bilateral pectoralis minor.    Manipulation was performed to the thoracic restrictions via grade 2 mobilization techniques and low force diversified maneuvers.  No high velocity thrust was applied.  An audible release occurred and was well-tolerated.    Subjective:  The patient stated he feels a little better when compared to the last chiropractic visit.  He stated he has been doing the stretching exercises daily and feels this is helping.  He reported that he has remained active and worked yesterday. he patient has pain in the bilateral medial cervicothoracic region/upper thoracic/superior angle of the scapular region, bilateral superior lateral shoulder, and bilateral mid deltoid region.  He has increased symptoms in the cervicothoracic region/upper thoracic/superior angle of the scapular region and shoulder regions with twisting coupled with upper extremity reaching movements.  He has decreased symptoms in the cervicothoracic region/upper thoracic/superior angle of the scapular region and shoulder region with rest. The patient described his cervicothoracic region/upper thoracic/superior angle of the scapular region and shoulder pain as a 5 on a 0-10 pain scale today.    Objective:  Active myofascial trigger points and hypertonicity were present in the bilateral superior trapezius, levator scapulae, and splenius capitis. Intersegmental motion was decreased in the thoracic spine including joint  dysfunctions at T3-4, T6-7, and T7-8.  Range of motion with active release technique maneuvers to the bilateral subscapularis and pectoralis minor are improving.         I have used the Epic copy/forward function to compose this note. I have reviewed my current note to ensure it reflects the current patient status, exam, assessment and plan.    Dictation voice to text software has been used in the creation of this document. Please consider this in light of any contextual or grammatical errors.

## 2024-10-16 NOTE — PATIENT INSTRUCTIONS
The patient was advised to continue with the icing and stretching instructions.   The patient was advised to continue with the home stretching and strengthening exercises.

## 2024-10-30 ENCOUNTER — PROCEDURE VISIT (OUTPATIENT)
Age: 19
End: 2024-10-30
Payer: COMMERCIAL

## 2024-10-30 VITALS
HEART RATE: 71 BPM | DIASTOLIC BLOOD PRESSURE: 64 MMHG | SYSTOLIC BLOOD PRESSURE: 110 MMHG | OXYGEN SATURATION: 98 % | HEIGHT: 68 IN | WEIGHT: 129 LBS | BODY MASS INDEX: 19.55 KG/M2

## 2024-10-30 DIAGNOSIS — M79.18 MYOFASCIAL PAIN: Primary | ICD-10-CM

## 2024-10-30 DIAGNOSIS — M99.01 SEGMENTAL DYSFUNCTION OF CERVICAL REGION: ICD-10-CM

## 2024-10-30 DIAGNOSIS — M99.02 SEGMENTAL DYSFUNCTION OF THORACIC REGION: ICD-10-CM

## 2024-10-30 PROCEDURE — 98940 CHIROPRACT MANJ 1-2 REGIONS: CPT | Performed by: CHIROPRACTOR

## 2024-10-30 NOTE — PROGRESS NOTES
"Assessment:    1. Myofascial pain        2. Segmental dysfunction of thoracic region        3. Segmental dysfunction of cervical region          Plan:  Treatment today consisted of myofascial release via Graston Technique to the bilateral upper thoracic musculature including the levator scapula at the superior angle of the scapula and supraspinatus. GT 3, GT 4, and GT 5 were used.  Myofascial release via active release technique was performed to the bilateral subscapularis (patient performed active movements) and bilateral pectoralis minor.    Manipulation was performed to the thoracic restrictions via grade 2 mobilization techniques and low force diversified maneuvers.  No high velocity thrust was applied.  An audible release occurred and was well-tolerated.    Subjective:  The patient stated he feels better when compared to the last chiropractic visit.  The patient reported that the intensity of his upper thoracic and bilateral superior lateral shoulder pain has not been much recently.  He stated it has been \"doing pretty good.\"  He stated he has been doing the stretching exercises.  He has continued to remain active. He patient has pain in the bilateral medial cervicothoracic region/upper thoracic/superior angle of the scapular region, bilateral superior lateral shoulder, and bilateral mid deltoid region.  He has increased symptoms in the cervicothoracic region/upper thoracic/superior angle of the scapular region and shoulder regions with twisting coupled with upper extremity reaching movements.  He has decreased symptoms in the cervicothoracic region/upper thoracic/superior angle of the scapular region and shoulder region with rest. The patient described his cervicothoracic region/upper thoracic/superior angle of the scapular region and shoulder pain as a 4 on a 0-10 pain scale today.    Objective:  Active myofascial trigger points and hypertonicity were present in the bilateral superior trapezius, levator " scapulae, and splenius capitis. Intersegmental motion was decreased in the thoracic spine including joint dysfunctions at T3-4, T4-5, and T7-8.  Range of motion with active release technique maneuvers to the bilateral subscapularis is better when compared to last chiropractic visit.  Hypertonicity is less pronounced in the bilateral pectoralis minor.         I have used the Epic copy/forward function to compose this note. I have reviewed my current note to ensure it reflects the current patient status, exam, assessment and plan.    Dictation voice to text software has been used in the creation of this document. Please consider this in light of any contextual or grammatical errors.

## 2024-11-06 ENCOUNTER — OFFICE VISIT (OUTPATIENT)
Age: 19
End: 2024-11-06
Payer: COMMERCIAL

## 2024-11-06 VITALS
OXYGEN SATURATION: 98 % | HEART RATE: 88 BPM | HEIGHT: 68 IN | WEIGHT: 129 LBS | SYSTOLIC BLOOD PRESSURE: 120 MMHG | BODY MASS INDEX: 19.55 KG/M2 | DIASTOLIC BLOOD PRESSURE: 80 MMHG

## 2024-11-06 DIAGNOSIS — M79.18 MYOFASCIAL PAIN: Primary | ICD-10-CM

## 2024-11-06 DIAGNOSIS — M99.01 SEGMENTAL DYSFUNCTION OF CERVICAL REGION: ICD-10-CM

## 2024-11-06 DIAGNOSIS — M99.02 SEGMENTAL DYSFUNCTION OF THORACIC REGION: ICD-10-CM

## 2024-11-06 PROCEDURE — 98940 CHIROPRACT MANJ 1-2 REGIONS: CPT | Performed by: CHIROPRACTOR

## 2024-11-06 NOTE — PROGRESS NOTES
"Assessment:    1. Myofascial pain        2. Segmental dysfunction of thoracic region        3. Segmental dysfunction of cervical region          Plan:  Treatment today consisted of myofascial release via Graston Technique to the bilateral upper thoracic musculature including the levator scapula at the superior angle of the scapula and supraspinatus. GT 3, GT 4, and GT 5 were used.  Myofascial release via active release technique was performed to the bilateral subscapularis (patient performed active movements) and bilateral pectoralis minor.    Manipulation was performed to the thoracic restrictions via grade 2 mobilization techniques and low force diversified maneuvers.  No high velocity thrust was applied.  An audible release occurred and was well-tolerated.    Subjective:  The patient stated he feels \"pretty good.\"  However he still described his pain level as a 4 on a 0-10 pain scale.  He reports that he is continue to work but is off today.  He stated he feels a mild symptoms in the bilateral medial upper thoracic region with most pronounced symptoms in the superior angle of the scapula/suprascapular border and bilateral superior lateral shoulder region. He has increased symptoms in the cervicothoracic region/upper thoracic/superior angle of the scapular region and shoulder regions with twisting coupled with upper extremity reaching movements.  He has decreased symptoms in the cervicothoracic region/upper thoracic/superior angle of the scapular region and shoulder region with rest. The patient described his pain level as a 4 on a 0-10 pain scale today.    Objective:  Active myofascial trigger points and hypertonicity were present in the bilateral superior trapezius, levator scapulae, and splenius capitis. Intersegmental motion was decreased in the thoracic spine including joint dysfunctions at T3-4, T6-7, and T7-8.  Range of motion with active release technique maneuvers to the bilateral subscapularis is better " when compared to last chiropractic visit.  Hypertonicity is less pronounced in the bilateral pectoralis minor.         I have used the Epic copy/forward function to compose this note. I have reviewed my current note to ensure it reflects the current patient status, exam, assessment and plan.    Dictation voice to text software has been used in the creation of this document. Please consider this in light of any contextual or grammatical errors.

## 2024-12-04 ENCOUNTER — PROCEDURE VISIT (OUTPATIENT)
Age: 19
End: 2024-12-04
Payer: COMMERCIAL

## 2024-12-04 VITALS
BODY MASS INDEX: 19.55 KG/M2 | WEIGHT: 129 LBS | DIASTOLIC BLOOD PRESSURE: 74 MMHG | OXYGEN SATURATION: 99 % | HEART RATE: 78 BPM | SYSTOLIC BLOOD PRESSURE: 130 MMHG | HEIGHT: 68 IN

## 2024-12-04 DIAGNOSIS — M79.18 MYOFASCIAL PAIN: Primary | ICD-10-CM

## 2024-12-04 DIAGNOSIS — M99.02 SEGMENTAL DYSFUNCTION OF THORACIC REGION: ICD-10-CM

## 2024-12-04 DIAGNOSIS — M99.01 SEGMENTAL DYSFUNCTION OF CERVICAL REGION: ICD-10-CM

## 2024-12-04 PROCEDURE — 98940 CHIROPRACT MANJ 1-2 REGIONS: CPT | Performed by: CHIROPRACTOR

## 2024-12-04 NOTE — PROGRESS NOTES
"Assessment:  I discussed the patient's compliance with the home exercise/icing program and chiropractic frequency of visits with the patient today.  I informed the patient that, although he has noticed improvement he is still describing moderate symptoms that can be addressed effectively with the provided chiropractic care and the home exercise program.  However the patient needs to do the home exercise program daily and needs to attend chiropractic visits more frequently in order to make adequate progress and have resolution of symptoms.  The patient indicated he understood    1. Myofascial pain        2. Segmental dysfunction of thoracic region        3. Segmental dysfunction of cervical region          Plan:  Treatment today consisted of myofascial release via Graston Technique to the bilateral upper thoracic musculature including the levator scapula at the superior angle of the scapula and supraspinatus. GT 3, GT 4, and GT 5 were used.  Myofascial release via active release technique was performed to the bilateral subscapularis (patient performed active movements) and bilateral pectoralis minor.    Manipulation was performed to the thoracic restrictions via grade 2 mobilization techniques and low force diversified maneuvers.  No high velocity thrust was applied.  An audible release occurred and was well-tolerated.    Subjective:  The patient stated his neck upper thoracic/shoulder and mid thoracic region have been feeling \"not bad.\"  He indicated having \"a little\" of discomfort but he still described his pain level as a 5 on a 0-10 pain scale.  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  He has symptoms in the bilateral medial upper thoracic region, " bilateral superior angle of the scapula/suprascapular border, and bilateral superior lateral shoulder region. He has increased symptoms in the cervicothoracic region/upper thoracic/superior angle of the scapular region and shoulder regions with twisting coupled with upper extremity reaching movements.  He has decreased symptoms in the cervicothoracic region/upper thoracic/superior angle of the scapular region and shoulder region with rest. The patient described his pain level as a 5 on a 0-10 pain scale today.  The patient was questioned how often he is doing the prescribed stretching exercises and strengthening exercises.  He stated he is not doing the exercises daily and stated that he is not doing the exercises as often as he should.  The patient reported that he has been unable to attend chiropractic visits because his grandfather passed away.    Objective:  Active myofascial trigger points and hypertonicity were present in the bilateral superior trapezius, levator scapulae, and splenius capitis. Intersegmental motion was decreased in the thoracic spine including joint dysfunctions at T3-4, T4-5, and T7-8.  Range of motion with active release technique maneuvers to the bilateral subscapularis is better when compared to last chiropractic visit.  Hypertonicity is less pronounced in the bilateral pectoralis minor.         I have used the Epic copy/forward function to compose this note. I have reviewed my current note to ensure it reflects the current patient status, exam, assessment and plan.    Dictation voice to text software has been used in the creation of this document. Please consider this in light of any contextual or grammatical errors.

## 2024-12-11 ENCOUNTER — OFFICE VISIT (OUTPATIENT)
Age: 19
End: 2024-12-11
Payer: COMMERCIAL

## 2024-12-11 VITALS
HEART RATE: 91 BPM | SYSTOLIC BLOOD PRESSURE: 110 MMHG | HEIGHT: 71 IN | BODY MASS INDEX: 18.06 KG/M2 | WEIGHT: 129 LBS | OXYGEN SATURATION: 97 % | DIASTOLIC BLOOD PRESSURE: 60 MMHG

## 2024-12-11 DIAGNOSIS — M99.02 SEGMENTAL DYSFUNCTION OF THORACIC REGION: ICD-10-CM

## 2024-12-11 DIAGNOSIS — M99.01 SEGMENTAL DYSFUNCTION OF CERVICAL REGION: ICD-10-CM

## 2024-12-11 DIAGNOSIS — M79.18 MYOFASCIAL PAIN: Primary | ICD-10-CM

## 2024-12-11 PROCEDURE — 98940 CHIROPRACT MANJ 1-2 REGIONS: CPT | Performed by: CHIROPRACTOR

## 2024-12-11 NOTE — PROGRESS NOTES
"Assessment:    1. Myofascial pain        2. Segmental dysfunction of thoracic region        3. Segmental dysfunction of cervical region          Plan:  Treatment today consisted of myofascial release via Graston Technique to the bilateral upper thoracic musculature including the levator scapula at the superior angle of the scapula and supraspinatus. GT 3, GT 4, and GT 5 were used.  Myofascial release via active release technique was performed to the bilateral subscapularis (patient performed active movements) and bilateral pectoralis minor.    Manipulation was performed to the thoracic restrictions via grade 2 mobilization techniques and low force diversified maneuvers.  No high velocity thrust was applied.  An audible release occurred and was well-tolerated.    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 He has symptoms in the bilateral medial upper thoracic region, bilateral superior angle of the scapula/suprascapular border, and bilateral superior lateral shoulder region. He has increased symptoms in the cervicothoracic region/upper thoracic/superior angle of the scapular region and shoulder regions with twisting coupled with upper extremity reaching movements.  He has decreased symptoms in the cervicothoracic region/upper thoracic/superior angle of the scapular region and shoulder region with rest. The patient described his pain level as a 4 on a 0-10 pain scale today.    Objective:  Active myofascial trigger points and hypertonicity were present in the bilateral superior trapezius, levator scapulae, and splenius capitis. Intersegmental motion was decreased in the thoracic spine including joint dysfunctions at  T4-5 and T7-8.  Range of motion with active release technique maneuvers to the " bilateral subscapularis is approaching normal limits on the right and is mildly limited on the left.       I have used the Epic copy/forward function to compose this note. I have reviewed my current note to ensure it reflects the current patient status, exam, assessment and plan.    Dictation voice to text software has been used in the creation of this document. Please consider this in light of any contextual or grammatical errors.

## 2024-12-18 ENCOUNTER — OFFICE VISIT (OUTPATIENT)
Age: 19
End: 2024-12-18
Payer: COMMERCIAL

## 2024-12-18 VITALS
HEIGHT: 68 IN | HEART RATE: 80 BPM | SYSTOLIC BLOOD PRESSURE: 112 MMHG | BODY MASS INDEX: 19.55 KG/M2 | OXYGEN SATURATION: 96 % | DIASTOLIC BLOOD PRESSURE: 70 MMHG | WEIGHT: 129 LBS

## 2024-12-18 DIAGNOSIS — M99.02 SEGMENTAL DYSFUNCTION OF THORACIC REGION: ICD-10-CM

## 2024-12-18 DIAGNOSIS — M99.01 SEGMENTAL DYSFUNCTION OF CERVICAL REGION: ICD-10-CM

## 2024-12-18 DIAGNOSIS — M79.18 MYOFASCIAL PAIN: Primary | ICD-10-CM

## 2024-12-18 PROCEDURE — 98940 CHIROPRACT MANJ 1-2 REGIONS: CPT | Performed by: CHIROPRACTOR

## 2024-12-18 NOTE — PROGRESS NOTES
"Assessment:    1. Myofascial pain        2. Segmental dysfunction of thoracic region        3. Segmental dysfunction of cervical region          Plan:  Treatment today consisted of myofascial release via Graston Technique to the bilateral upper thoracic musculature including the levator scapula at the superior angle of the scapula and supraspinatus. GT 3, GT 4, and GT 5 were used.  Myofascial release via active release technique was performed to the bilateral pectoralis minor.    Manipulation was performed to the thoracic restrictions via grade 2 mobilization techniques and low force diversified maneuvers.  No high velocity thrust was applied.  An audible release occurred and was well-tolerated.    Subjective:  Once again the patient stated his neck upper thoracic/shoulder and mid thoracic region have been feeling \"pretty good.\"  He has symptoms in the bilateral medial upper thoracic region, bilateral superior angle of the scapula/suprascapular border, and bilateral superior lateral shoulder region. He has increased symptoms in the cervicothoracic region/upper thoracic/superior angle of the scapular region and shoulder regions with twisting coupled with upper extremity reaching movements.  He has decreased symptoms in the cervicothoracic region/upper thoracic/superior angle of the scapular region and shoulder region with rest. The patient described his upper to mid thoracic pain level as a 4 on a 0-10 pain scale today.  The patient described his neck pain as a 0 on a 0-10 pain scale today.    Objective:  Active myofascial trigger points and hypertonicity were present in the bilateral superior trapezius, levator scapulae, and splenius capitis. Intersegmental motion was decreased in the thoracic spine including joint dysfunctions at T5-6 and T6-7.  Range of motion with active release technique maneuvers to the bilateral pectoralis minor are approaching normal limits.       I have used the Epic copy/forward function to " compose this note. I have reviewed my current note to ensure it reflects the current patient status, exam, assessment and plan.    Dictation voice to text software has been used in the creation of this document. Please consider this in light of any contextual or grammatical errors.

## 2024-12-27 ENCOUNTER — OFFICE VISIT (OUTPATIENT)
Age: 19
End: 2024-12-27
Payer: COMMERCIAL

## 2024-12-27 VITALS
SYSTOLIC BLOOD PRESSURE: 104 MMHG | OXYGEN SATURATION: 98 % | HEIGHT: 68 IN | WEIGHT: 129 LBS | DIASTOLIC BLOOD PRESSURE: 60 MMHG | BODY MASS INDEX: 19.55 KG/M2 | HEART RATE: 89 BPM

## 2024-12-27 DIAGNOSIS — M79.18 MYOFASCIAL PAIN: Primary | ICD-10-CM

## 2024-12-27 DIAGNOSIS — M99.01 SEGMENTAL DYSFUNCTION OF CERVICAL REGION: ICD-10-CM

## 2024-12-27 DIAGNOSIS — M99.02 SEGMENTAL DYSFUNCTION OF THORACIC REGION: ICD-10-CM

## 2024-12-27 PROCEDURE — 98940 CHIROPRACT MANJ 1-2 REGIONS: CPT | Performed by: CHIROPRACTOR

## 2024-12-27 NOTE — PROGRESS NOTES
"Assessment:    1. Myofascial pain        2. Segmental dysfunction of thoracic region        3. Segmental dysfunction of cervical region          Plan:  Treatment today consisted of myofascial release via Graston Technique to the bilateral upper thoracic musculature including the levator scapula at the superior angle of the scapula and supraspinatus. GT 3, GT 4, and GT 5 were used.  Myofascial release via active release technique was performed to the bilateral pectoralis minor.    Manipulation was performed to the thoracic restrictions via grade 2 mobilization techniques and low force diversified maneuvers.  No high velocity thrust was applied.  An audible release occurred and was well-tolerated.    Subjective:  The patient stated his neck upper thoracic/shoulder and mid thoracic region have been feeling \"pretty good.\"  He has symptoms in the bilateral medial upper thoracic region, bilateral superior angle of the scapula/suprascapular border during his workday and stated he experiences a pain level of a 6 on a 0-10 pain scale at times.  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  However when he is not working he does not experience the symptoms.  When questioned he also indicated that there is some discomfort associated with reaching/lifting of items.  He reported that it is a repetitive movement job as his neck is turning back and forth from neutral to the left and back frequently over the course of the day. He has increased symptoms in the cervicothoracic region/upper thoracic/superior angle of the scapular region and shoulder regions with twisting coupled with upper extremity reaching movements.  He has decreased symptoms in the cervicothoracic region/upper thoracic/superior angle of the scapular region and shoulder region with rest. The patient described his upper to mid thoracic pain level as a 0 on a 0-10 pain scale today.  The patient described his neck pain as a 0 " on a 0-10 pain scale today.  The patient stated that his insurance is changing at the beginning of the upcoming year and therefore he may not be able to continue with chiropractic care at this facility.  He may be able to make an appointment earlier next week for treatment/evaluation.    Objective:  Active myofascial trigger points and hypertonicity were present in the bilateral superior trapezius, levator scapulae, and splenius capitis. Intersegmental motion was decreased in the thoracic spine including joint dysfunctions at T5-6 and T6-7.  Range of motion with active release technique maneuvers to the bilateral pectoralis minor are within normal limits.       I have used the Epic copy/forward function to compose this note. I have reviewed my current note to ensure it reflects the current patient status, exam, assessment and plan.    Dictation voice to text software has been used in the creation of this document. Please consider this in light of any contextual or grammatical errors.

## 2024-12-27 NOTE — PATIENT INSTRUCTIONS
The patient was advised to continue with the home stretching and strengthening exercises.   The patient was advised to apply ice as needed.

## 2025-05-27 ENCOUNTER — TELEPHONE (OUTPATIENT)
Age: 20
End: 2025-05-27

## 2025-05-29 ENCOUNTER — OFFICE VISIT (OUTPATIENT)
Age: 20
End: 2025-05-29

## 2025-05-29 VITALS
OXYGEN SATURATION: 99 % | HEIGHT: 67 IN | WEIGHT: 123 LBS | SYSTOLIC BLOOD PRESSURE: 112 MMHG | DIASTOLIC BLOOD PRESSURE: 70 MMHG | BODY MASS INDEX: 19.3 KG/M2 | HEART RATE: 64 BPM

## 2025-05-29 DIAGNOSIS — M79.18 MYOFASCIAL PAIN: Primary | ICD-10-CM

## 2025-05-29 DIAGNOSIS — M99.02 SEGMENTAL DYSFUNCTION OF THORACIC REGION: ICD-10-CM

## 2025-05-29 DIAGNOSIS — M99.01 SEGMENTAL DYSFUNCTION OF CERVICAL REGION: ICD-10-CM

## 2025-05-29 PROCEDURE — 98940 CHIROPRACT MANJ 1-2 REGIONS: CPT | Performed by: CHIROPRACTOR

## 2025-05-29 PROCEDURE — 99213 OFFICE O/P EST LOW 20 MIN: CPT | Performed by: CHIROPRACTOR

## 2025-05-29 NOTE — PATIENT INSTRUCTIONS
The patient was advised to continue with the home stretching and strengthening exercises.   The patient was advised to apply ice to the region in 15-minute intervals a minimum of 3 times per day.   The patient was informed that he may experience additional soreness following the today's treatment visit. The need to continue with the stretching exercises and apply ice in 15-minute intervals was discussed with the patient.

## 2025-05-29 NOTE — PROGRESS NOTES
1. Myofascial pain        2. Segmental dysfunction of cervical region        3. Segmental dysfunction of thoracic region          Assessment/Plan:    Review of office note/neck index:    The patient's September 27, 202 chiropractic reevaluation office note and neck index were reviewed today to determine progress since the chiropractic reevaluation.  The patient's December 27, 2024 chiropractic office note was also reviewed.    Medical Decision Making and Treatment Plan:    The patient has not had chiropractic care since late December 2024.  He feels as though the chiropractic care had been helping.  Although the patient does not continue with the stretching and strengthening exercises as instructed.  Therefore it is expected that his symptoms have returned somewhat.    Objectively, the patient has increased left cervical rotation but decreased cervical extension, left cervical lateral flexion, and right cervical rotation when compared to his  last chiropractic re-evaluation. his neck index remained the same (16) indicating similar function since his last chiropractic re-evaluation. his back index increased from 16 to 22 indicating decreased function since his last chiropractic re-evaluation.    The patient will be treated with conservative chiropractic care including myofascial release, joint mobilization, and a home stretching and icing program.    The patient will be seen 1 times per week for 6-8 weeks and then reassess for progress.    Patient Instructions:    The need for the patient to continue with the stretching and strengthening exercises in order to make adequate progress was discussed with the patient.  He was informed that this is especially true once he is discharged from chiropractic care.  He indicated he understood.  The push-up plus exercise on the wall and pectoral/subscapularis stretch were reviewed with patient today.  The patient was advised to continue with the home stretching and  "strengthening exercises.   The patient was advised to apply ice to the region in 15-minute intervals a minimum of 3 times per day.   The patient was informed that he may experience additional soreness following the today's treatment visit. The need to continue with the stretching exercises and apply ice in 15-minute intervals was discussed with the patient.     Goals:    JMLGOALS: Improve patient's ability to tolerate prolonged physical activity, Improve cervical range of motion, and improve patient's ability to tolerate prolonged working    During the consultation the following was included: The patient's history/current complaints, physical exam, reviewing the chiropractic reevaluation office note and neck/back index to determine progress since last chiropractic reevaluation, reviewing home instruction/reducing risk factors with the patient, reviewing my findings/progress with the patient, and discussing the treatment/treatment plan with the patient.    This is a separate identifiable portion of today's visit from the E and M code billed.    Treatment today consisted of  myofascial release via Graston Technique to the bilateral upper thoracic musculature including the levator scapula at the superior angle of the scapula and supraspinatus. GT 3, GT 4, and GT 5 were used.  Myofascial release via active release technique was performed to the bilateral pectoralis minor.    Manipulation was performed to the thoracic restrictions via grade 2 mobilization techniques and low force diversified maneuvers.  No high velocity thrust was applied.  An audible release occurred and was well-tolerated..    Subjective:      Lew Joel is a 20 y.o. male reported to the has return of s stiffness/pain in the bilateral upper thoracic and shoulder regions.  The patient was question whether he had continued with the home stretching and strengthening exercises after his discharge from chiropractic care.  He stated he did the exercises \"for " "a while and then I stopped completely.\"  Patient stated he did not feel the exercises were helping when he has been doing them.  He stated that the chiropractic care \"made a lasting impact for sure.\"  The patient pointed to the bilateral thoracic and bilateral superior lateral region as the site of \"sore\" sensation.  He also reported feeling \"a little bit\" of discomfort in the bilateral mid thoracic region.  Patient stated he is continuing to work the same job and has worsening pain in the upper thoracic and bilateral superior lateral shoulder region with working.  He stated that he is off of work today and he is not feeling \"not much pain.\"  He denied experiencing pain or paresthesias in the upper extremities.  He stated he has decreased symptoms in the upper thoracic and superior lateral shoulder regions with applying ice to the involved regions.  He describes his pain level as a 5 or 6 on a 0-10 pain scale today.       Objective:     /70   Pulse 64   Ht 5' 7\" (1.702 m) Comment: per patient  Wt 55.8 kg (123 lb)   SpO2 99%   BMI 19.26 kg/m²     Vitals:    05/29/25 1438   BP: 112/70   Pulse: 64   SpO2: 99%   Weight: 55.8 kg (123 lb)   Height: 5' 7\" (1.702 m)     Appearance: Well developed, well nourished, and in no acute distress    Alert and oriented x 3    Mood/Affect: calm and pleasant    Gait/Posture:    Gait: Normal    Antalgic posture: None    Lordosis: Cervical - decreased    Kyphosis: Thoracic- normal    The patient has a significant rounded shoulder posture with a forward head carriage.    Upper extremity reflexes:    Deep tendon reflexes were normal and symmetrical in the upper extremities.    Upper Extremity Muscle Testing:    Muscle testing of the bilateral upper extremities was normal and graded +5/5.    Sensory:    Sensation to light touch was normal and symmetrical in the bilateral upper extremities.    Quiles's was negative bilaterally.    Cervical Orthopedic Tests:    Maximal Foraminal " "Compression was was negative  bilaterally.    Swanson-Robin/Elida/Jeanette: negative     Rotator Cuff Muscle Testing:    Right    Supraspinatus grade 5 - 100% normal (N) complete motion against gravity and full resistance    Infraspinatus grade 5 - 100% normal (N) complete motion against gravity and full resistance    Subscapularis grade 5 - 100% normal (N) complete motion against gravity and full resistance \"A little bit\" of discomfort in the right anterior shoulder with muscle testing of the right subscapularis \"but not much.\"    Left    Supraspinatus grade 5 - 100% normal (N) complete motion against gravity and full resistance    Infraspinatus grade 5 - 100% normal (N) complete motion against gravity and full resistance    Subscapularis grade 5 - 100% normal (N) complete motion against gravity and full resistance    Active Cervical Ranges of Motion:    Flexion: 65 degrees    Extension: 38 degrees    Right lateral flexion: 42 degrees    Left Lateral flexion: 38 degrees    Right Rotation: 73 degrees    Left Rotation: 76 degrees    Palpation:    Active myofascial trigger points were present in the bilateral superior trapezius, levator scapulae, and bilateral supraspinatus. Hypertonicity was present in the bilateral pectoralis minor, bilateral pectoralis major, and bilateral subscapularis. Intersegmental motion was decreased in the cervical spine including joint dysfunctions at C1-C2, C5-C6, and C6-C7. Intersegmental motion was decreased in the thoracic spine including joint dysfunctions at T3-4, T6-7, and T7-8.         I have used the Epic copy/forward function to compose this note. I have reviewed my current note to ensure it reflects the current patient status, exam, assessment and plan.    Dictation voice to text software has been used in the creation of this document. Please consider this in light of any contextual or grammatical errors.  "

## 2025-06-04 ENCOUNTER — OFFICE VISIT (OUTPATIENT)
Age: 20
End: 2025-06-04

## 2025-06-04 VITALS
HEART RATE: 75 BPM | HEIGHT: 67 IN | SYSTOLIC BLOOD PRESSURE: 118 MMHG | BODY MASS INDEX: 19.26 KG/M2 | OXYGEN SATURATION: 97 % | DIASTOLIC BLOOD PRESSURE: 70 MMHG

## 2025-06-04 DIAGNOSIS — M99.02 SEGMENTAL DYSFUNCTION OF THORACIC REGION: ICD-10-CM

## 2025-06-04 DIAGNOSIS — M79.18 MYOFASCIAL PAIN: Primary | ICD-10-CM

## 2025-06-04 DIAGNOSIS — M99.01 SEGMENTAL DYSFUNCTION OF CERVICAL REGION: ICD-10-CM

## 2025-06-04 PROCEDURE — 98940 CHIROPRACT MANJ 1-2 REGIONS: CPT | Performed by: CHIROPRACTOR

## 2025-06-04 PROCEDURE — 97140 MANUAL THERAPY 1/> REGIONS: CPT | Performed by: CHIROPRACTOR

## 2025-06-04 NOTE — PATIENT INSTRUCTIONS
The patient was advised to continue with the home stretching and strengthening exercises.   The patient was advised to apply ice to the region in 15-minute intervals a minimum of 3 times per day.   The patient was informed they may see redness or possible bruising in the region of Graston technique treatment.   The patient was informed that he may experience additional soreness following the today's treatment visit. The need to continue with the stretching exercises and apply ice in 15-minute intervals was discussed with the patient.

## 2025-06-04 NOTE — PROGRESS NOTES
Assessment:  The patient is progressing as expected.  The patient was informed they may see redness or possible bruising in the region of Graston technique treatment.  The patient was informed that he may experience additional soreness following the today's treatment visit. The need to continue with the stretching exercises and apply ice in 15-minute intervals was discussed with the patient.    1. Myofascial pain        2. Segmental dysfunction of cervical region        3. Segmental dysfunction of thoracic region          Plan:  Treatment today consisted of  myofascial release via Graston Technique to the bilateral upper thoracic musculature including the levator scapula at the superior angle of the scapula and supraspinatus. GT 3, GT 4, and GT 5 were used.  Myofascial release via active release technique was performed to the bilateral pectoralis minor.  At least 8 to 10 minutes of myofascial release was performed.    Manipulation was performed to the cervical restrictions via gentle cervical traction technique.    Subjective:  The patient reported feeling a little better when compared to the last chiropractic visit.  He stated he has not been scheduled for work and therefore will be off of work for the next 8 days.  He has decreased intensity of stiffness/pain in the bilateral upper thoracic and shoulder regions.  The patient also has mild discomfort in the bilateral mid thoracic region.  He has increased pain with physical work.  He stated he has decreased symptoms in the upper thoracic and superior lateral shoulder regions with applying ice to the involved regions.  He describes his pain level as a 5 on a 0-10 pain scale today.    Objective:  Active myofascial trigger points were present in the bilateral superior trapezius, levator scapulae, and bilateral supraspinatus. Hypertonicity was present in the bilateral pectoralis minor, bilateral pectoralis major, and bilateral subscapularis. Intersegmental motion was  decreased in the thoracic spine including joint dysfunctions at T2-3, T3-4, T6-7, and T7-8.  Petechia occurred relatively quickly with Graston Technique to the upper thoracic and superior end of the scapular regions.         I have used the Epic copy/forward function to compose this note. I have reviewed my current note to ensure it reflects the current patient status, exam, assessment and plan.    Dictation voice to text software has been used in the creation of this document. Please consider this in light of any contextual or grammatical errors.